# Patient Record
Sex: MALE | Race: WHITE | NOT HISPANIC OR LATINO | URBAN - METROPOLITAN AREA
[De-identification: names, ages, dates, MRNs, and addresses within clinical notes are randomized per-mention and may not be internally consistent; named-entity substitution may affect disease eponyms.]

---

## 2024-01-01 ENCOUNTER — APPOINTMENT (OUTPATIENT)
Dept: CT IMAGING | Facility: HOSPITAL | Age: 19
End: 2024-01-01
Payer: COMMERCIAL

## 2024-01-01 ENCOUNTER — HOSPITAL ENCOUNTER (EMERGENCY)
Facility: HOSPITAL | Age: 19
Discharge: HOME | End: 2024-01-01
Attending: EMERGENCY MEDICINE | Admitting: EMERGENCY MEDICINE
Payer: COMMERCIAL

## 2024-01-01 VITALS
HEART RATE: 78 BPM | DIASTOLIC BLOOD PRESSURE: 96 MMHG | HEIGHT: 75 IN | SYSTOLIC BLOOD PRESSURE: 128 MMHG | BODY MASS INDEX: 31.08 KG/M2 | WEIGHT: 250 LBS | OXYGEN SATURATION: 98 % | TEMPERATURE: 98.2 F | RESPIRATION RATE: 15 BRPM

## 2024-01-01 DIAGNOSIS — S19.9XXA NECK INJURY, INITIAL ENCOUNTER: Primary | ICD-10-CM

## 2024-01-01 DIAGNOSIS — J01.00 ACUTE MAXILLARY SINUSITIS, RECURRENCE NOT SPECIFIED: ICD-10-CM

## 2024-01-01 LAB
ALBUMIN SERPL-MCNC: 4.9 G/DL (ref 3.5–5.2)
ALBUMIN/GLOB SERPL: 3.3 G/DL
ALP SERPL-CCNC: 111 U/L (ref 39–117)
ALT SERPL W P-5'-P-CCNC: 31 U/L (ref 1–41)
ANION GAP SERPL CALCULATED.3IONS-SCNC: 9.2 MMOL/L (ref 5–15)
AST SERPL-CCNC: 28 U/L (ref 1–40)
BASOPHILS # BLD AUTO: 0.04 10*3/MM3 (ref 0–0.2)
BASOPHILS NFR BLD AUTO: 0.5 % (ref 0–1.5)
BILIRUB SERPL-MCNC: 2.5 MG/DL (ref 0–1.2)
BUN SERPL-MCNC: 16 MG/DL (ref 6–20)
BUN/CREAT SERPL: 15.4 (ref 7–25)
CALCIUM SPEC-SCNC: 9.3 MG/DL (ref 8.6–10.5)
CHLORIDE SERPL-SCNC: 106 MMOL/L (ref 98–107)
CO2 SERPL-SCNC: 26.8 MMOL/L (ref 22–29)
CREAT SERPL-MCNC: 1.04 MG/DL (ref 0.76–1.27)
DEPRECATED RDW RBC AUTO: 38.4 FL (ref 37–54)
EGFRCR SERPLBLD CKD-EPI 2021: 105.4 ML/MIN/1.73
EOSINOPHIL # BLD AUTO: 0.69 10*3/MM3 (ref 0–0.4)
EOSINOPHIL NFR BLD AUTO: 9 % (ref 0.3–6.2)
ERYTHROCYTE [DISTWIDTH] IN BLOOD BY AUTOMATED COUNT: 12.3 % (ref 12.3–15.4)
GLOBULIN UR ELPH-MCNC: 1.5 GM/DL
GLUCOSE SERPL-MCNC: 100 MG/DL (ref 65–99)
HCT VFR BLD AUTO: 46.8 % (ref 37.5–51)
HGB BLD-MCNC: 16 G/DL (ref 13–17.7)
IMM GRANULOCYTES # BLD AUTO: 0.01 10*3/MM3 (ref 0–0.05)
IMM GRANULOCYTES NFR BLD AUTO: 0.1 % (ref 0–0.5)
LYMPHOCYTES # BLD AUTO: 2.16 10*3/MM3 (ref 0.7–3.1)
LYMPHOCYTES NFR BLD AUTO: 28.1 % (ref 19.6–45.3)
MCH RBC QN AUTO: 28.9 PG (ref 26.6–33)
MCHC RBC AUTO-ENTMCNC: 34.2 G/DL (ref 31.5–35.7)
MCV RBC AUTO: 84.6 FL (ref 79–97)
MONOCYTES # BLD AUTO: 0.69 10*3/MM3 (ref 0.1–0.9)
MONOCYTES NFR BLD AUTO: 9 % (ref 5–12)
NEUTROPHILS NFR BLD AUTO: 4.09 10*3/MM3 (ref 1.7–7)
NEUTROPHILS NFR BLD AUTO: 53.3 % (ref 42.7–76)
PLATELET # BLD AUTO: 252 10*3/MM3 (ref 140–450)
PMV BLD AUTO: 10.9 FL (ref 6–12)
POTASSIUM SERPL-SCNC: 4.2 MMOL/L (ref 3.5–5.2)
PROT SERPL-MCNC: 6.4 G/DL (ref 6–8.5)
RBC # BLD AUTO: 5.53 10*6/MM3 (ref 4.14–5.8)
SODIUM SERPL-SCNC: 142 MMOL/L (ref 136–145)
WBC NRBC COR # BLD AUTO: 7.68 10*3/MM3 (ref 3.4–10.8)

## 2024-01-01 PROCEDURE — 70491 CT SOFT TISSUE NECK W/DYE: CPT

## 2024-01-01 PROCEDURE — 80053 COMPREHEN METABOLIC PANEL: CPT

## 2024-01-01 PROCEDURE — 25010000002 DEXAMETHASONE SODIUM PHOSPHATE 10 MG/ML SOLUTION

## 2024-01-01 PROCEDURE — 25510000001 IOPAMIDOL PER 1 ML: Performed by: EMERGENCY MEDICINE

## 2024-01-01 PROCEDURE — 96374 THER/PROPH/DIAG INJ IV PUSH: CPT

## 2024-01-01 PROCEDURE — 85025 COMPLETE CBC W/AUTO DIFF WBC: CPT

## 2024-01-01 PROCEDURE — 36415 COLL VENOUS BLD VENIPUNCTURE: CPT

## 2024-01-01 PROCEDURE — 99285 EMERGENCY DEPT VISIT HI MDM: CPT

## 2024-01-01 PROCEDURE — 25010000002 KETOROLAC TROMETHAMINE PER 15 MG

## 2024-01-01 PROCEDURE — 96375 TX/PRO/DX INJ NEW DRUG ADDON: CPT

## 2024-01-01 RX ORDER — SODIUM CHLORIDE 0.9 % (FLUSH) 0.9 %
10 SYRINGE (ML) INJECTION AS NEEDED
Status: DISCONTINUED | OUTPATIENT
Start: 2024-01-01 | End: 2024-01-01 | Stop reason: HOSPADM

## 2024-01-01 RX ORDER — DOXYCYCLINE 100 MG/1
100 CAPSULE ORAL 2 TIMES DAILY
Qty: 14 CAPSULE | Refills: 0 | Status: SHIPPED | OUTPATIENT
Start: 2024-01-01 | End: 2024-01-08

## 2024-01-01 RX ORDER — KETOROLAC TROMETHAMINE 30 MG/ML
30 INJECTION, SOLUTION INTRAMUSCULAR; INTRAVENOUS ONCE
Status: COMPLETED | OUTPATIENT
Start: 2024-01-01 | End: 2024-01-01

## 2024-01-01 RX ORDER — DEXAMETHASONE SODIUM PHOSPHATE 10 MG/ML
10 INJECTION, SOLUTION INTRAMUSCULAR; INTRAVENOUS ONCE
Status: COMPLETED | OUTPATIENT
Start: 2024-01-01 | End: 2024-01-01

## 2024-01-01 RX ADMIN — IOPAMIDOL 75 ML: 755 INJECTION, SOLUTION INTRAVENOUS at 16:52

## 2024-01-01 RX ADMIN — KETOROLAC TROMETHAMINE 30 MG: 30 INJECTION INTRAMUSCULAR; INTRAVENOUS at 17:30

## 2024-01-01 RX ADMIN — DEXAMETHASONE SODIUM PHOSPHATE 10 MG: 10 INJECTION, SOLUTION INTRAMUSCULAR; INTRAVENOUS at 17:30

## 2024-01-01 NOTE — DISCHARGE INSTRUCTIONS
Return to emergency department for any worsening signs or symptoms, such as shortness of breath or difficulty swallowing.  Recommended follow-up with PCP.  Refer to the attached instructions for further information.  Take antibiotics for sinus infection as directed until it is gone. It treats the infection and stops it from returning. Not taking all the medicine can make future infections hard to treat.
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
## 2024-01-26 ENCOUNTER — OFFICE VISIT (OUTPATIENT)
Dept: FAMILY MEDICINE CLINIC | Facility: CLINIC | Age: 19
End: 2024-01-26
Payer: COMMERCIAL

## 2024-01-26 VITALS
OXYGEN SATURATION: 96 % | DIASTOLIC BLOOD PRESSURE: 70 MMHG | WEIGHT: 252 LBS | TEMPERATURE: 97.3 F | HEIGHT: 74 IN | HEART RATE: 80 BPM | SYSTOLIC BLOOD PRESSURE: 104 MMHG | BODY MASS INDEX: 32.34 KG/M2

## 2024-01-26 DIAGNOSIS — R17 ELEVATED BILIRUBIN: Primary | ICD-10-CM

## 2024-01-26 DIAGNOSIS — Z91.09 ENVIRONMENTAL ALLERGIES: ICD-10-CM

## 2024-01-26 DIAGNOSIS — Z13.6 SCREENING FOR ISCHEMIC HEART DISEASE: ICD-10-CM

## 2024-01-26 NOTE — PROGRESS NOTES
"Chief Complaint  Establish Care (Pt would like to discuss recent labs and elevation /-ct scan done that showed cyst in each sinus cavity (ENT referral))    Subjective        Tenzin Figueroa presents to [facility]  History of Present Illness  18yoM with elevated bilirubin who presents to establish care.     Recent neck injury that he went to the ER for and CT showed:     The visualized contents of the cranial vault are within normal limits. Scattered shotty lymph nodes are incidentally noted within the neck and mucous retention cyst within the right and left maxillary sinus. Mucosal thickening and secretions are seen within the ethmoid air cells.     Has chronic allergies - not taking medication currently.    Also has had chronic mild elevation of bilirubin. No jaundice, scleral icterus. No severe abd pain or fevers.             Objective   Vital Signs:  /70 (BP Location: Right arm, Patient Position: Sitting, Cuff Size: Adult)   Pulse 80   Temp 97.3 °F (36.3 °C) (Infrared)   Ht 188 cm (74\")   Wt 114 kg (252 lb)   SpO2 96%   BMI 32.35 kg/m²   Estimated body mass index is 32.35 kg/m² as calculated from the following:    Height as of this encounter: 188 cm (74\").    Weight as of this encounter: 114 kg (252 lb).  Facility age limit for growth %cristian is 20 years.    BMI is >= 30 and <35. (Class 1 Obesity). The following options were offered after discussion;: exercise counseling/recommendations and nutrition counseling/recommendations      Physical Exam  Constitutional:       General: He is not in acute distress.  Eyes:      General: No scleral icterus.     Conjunctiva/sclera: Conjunctivae normal.   Cardiovascular:      Rate and Rhythm: Normal rate and regular rhythm.   Pulmonary:      Effort: Pulmonary effort is normal. No respiratory distress.      Breath sounds: Normal breath sounds.   Abdominal:      Tenderness: There is no abdominal tenderness. There is no guarding or rebound. "   Skin:     General: Skin is warm and dry.      Coloration: Skin is not jaundiced.   Neurological:      Mental Status: He is alert and oriented to person, place, and time.   Psychiatric:         Mood and Affect: Mood normal.         Behavior: Behavior normal.        Result Review :    The following data was reviewed by: Latrice Kumar MD on 01/26/2024:  Common labs          1/1/2024    15:55 1/26/2024    14:30   Common Labs   Glucose 100  82    BUN 16  15    Creatinine 1.04  1.09    Sodium 142  139    Potassium 4.2  4.2    Chloride 106  102    Calcium 9.3  9.8    Total Protein  6.5    Albumin 4.9  4.9    Total Bilirubin 2.5  2.1    Alkaline Phosphatase 111  97    AST (SGOT) 28  21    ALT (SGPT) 31  33    WBC 7.68     Hemoglobin 16.0     Hematocrit 46.8     Platelets 252     Total Cholesterol  178    Triglycerides  100    HDL Cholesterol  31    LDL Cholesterol   128      Data reviewed : none             Assessment and Plan     Diagnoses and all orders for this visit:    1. Elevated bilirubin (Primary)  Assessment & Plan:  Discussed that may be related to biliary disease, Gilbert Syndrome. No evidence of jaundice, scleral icterus. Recent CBC without anemia - low risk of hemolysis. Repeat CMP and direct bili for monitoring.     Orders:  -     Comprehensive metabolic panel  -     Bilirubin, Direct    2. Screening for ischemic heart disease  -     Lipid panel    3. Environmental allergies  Assessment & Plan:  Discussed CT scan consistent with chronic allergies.   Recommend intranasal steroids, antihistamines regularly. Consider allergy shots.                Follow Up     No follow-ups on file.  Patient was given instructions and counseling regarding his condition or for health maintenance advice. Please see specific information pulled into the AVS if appropriate.

## 2024-01-27 LAB
ALBUMIN SERPL-MCNC: 4.9 G/DL (ref 3.5–5.2)
ALBUMIN/GLOB SERPL: 3.1 G/DL
ALP SERPL-CCNC: 97 U/L (ref 39–117)
ALT SERPL-CCNC: 33 U/L (ref 1–41)
AST SERPL-CCNC: 21 U/L (ref 1–40)
BILIRUB DIRECT SERPL-MCNC: 0.3 MG/DL (ref 0–0.3)
BILIRUB SERPL-MCNC: 2.1 MG/DL (ref 0–1.2)
BUN SERPL-MCNC: 15 MG/DL (ref 6–20)
BUN/CREAT SERPL: 13.8 (ref 7–25)
CALCIUM SERPL-MCNC: 9.8 MG/DL (ref 8.6–10.5)
CHLORIDE SERPL-SCNC: 102 MMOL/L (ref 98–107)
CHOLEST SERPL-MCNC: 178 MG/DL (ref 0–200)
CO2 SERPL-SCNC: 25.5 MMOL/L (ref 22–29)
CREAT SERPL-MCNC: 1.09 MG/DL (ref 0.76–1.27)
EGFRCR SERPLBLD CKD-EPI 2021: 99.6 ML/MIN/1.73
GLOBULIN SER CALC-MCNC: 1.6 GM/DL
GLUCOSE SERPL-MCNC: 82 MG/DL (ref 65–99)
HDLC SERPL-MCNC: 31 MG/DL (ref 40–60)
LDLC SERPL CALC-MCNC: 128 MG/DL (ref 0–100)
POTASSIUM SERPL-SCNC: 4.2 MMOL/L (ref 3.5–5.2)
PROT SERPL-MCNC: 6.5 G/DL (ref 6–8.5)
SODIUM SERPL-SCNC: 139 MMOL/L (ref 136–145)
TRIGL SERPL-MCNC: 100 MG/DL (ref 0–150)
VLDLC SERPL CALC-MCNC: 19 MG/DL (ref 5–40)

## 2024-02-01 PROBLEM — Z91.09 ENVIRONMENTAL ALLERGIES: Status: ACTIVE | Noted: 2024-02-01

## 2024-02-01 PROBLEM — R17 ELEVATED BILIRUBIN: Status: ACTIVE | Noted: 2024-02-01

## 2024-02-01 NOTE — ASSESSMENT & PLAN NOTE
Discussed that may be related to biliary disease, Gilbert Syndrome. No evidence of jaundice, scleral icterus. Recent CBC without anemia - low risk of hemolysis. Repeat CMP and direct bili for monitoring.

## 2024-02-01 NOTE — ASSESSMENT & PLAN NOTE
Discussed CT scan consistent with chronic allergies.   Recommend intranasal steroids, antihistamines regularly. Consider allergy shots.

## 2024-05-29 ENCOUNTER — OFFICE VISIT (OUTPATIENT)
Dept: FAMILY MEDICINE CLINIC | Facility: CLINIC | Age: 19
End: 2024-05-29
Payer: COMMERCIAL

## 2024-05-29 VITALS
OXYGEN SATURATION: 99 % | BODY MASS INDEX: 32.33 KG/M2 | HEIGHT: 75 IN | DIASTOLIC BLOOD PRESSURE: 76 MMHG | HEART RATE: 84 BPM | WEIGHT: 260 LBS | SYSTOLIC BLOOD PRESSURE: 142 MMHG

## 2024-05-29 DIAGNOSIS — R41.840 IMPAIRED CONCENTRATION: Primary | ICD-10-CM

## 2024-05-29 PROCEDURE — 99214 OFFICE O/P EST MOD 30 MIN: CPT | Performed by: STUDENT IN AN ORGANIZED HEALTH CARE EDUCATION/TRAINING PROGRAM

## 2024-05-29 RX ORDER — LEVOCETIRIZINE DIHYDROCHLORIDE 5 MG/1
2.5 TABLET, FILM COATED ORAL EVERY EVENING
COMMUNITY
Start: 2024-05-09 | End: 2024-05-29

## 2024-05-29 RX ORDER — ALBUTEROL SULFATE AND BUDESONIDE 90; 80 UG/1; UG/1
AEROSOL, METERED RESPIRATORY (INHALATION) AS NEEDED
COMMUNITY
Start: 2024-04-30

## 2024-05-29 RX ORDER — DESLORATADINE 5 MG/1
1 TABLET ORAL DAILY
COMMUNITY
Start: 2024-04-30

## 2024-05-29 RX ORDER — GUAIFENESIN AND PSEUDOEPHEDRINE HYDROCHLORIDE 600; 60 MG/1; MG/1
1 TABLET, EXTENDED RELEASE ORAL EVERY 12 HOURS
COMMUNITY
Start: 2024-04-30

## 2024-05-29 RX ORDER — EPINEPHRINE 0.3 MG/.3ML
0.15 INJECTION SUBCUTANEOUS ONCE
COMMUNITY
Start: 2024-04-30

## 2024-05-29 RX ORDER — ATOMOXETINE 40 MG/1
40 CAPSULE ORAL DAILY
Qty: 30 CAPSULE | Refills: 1 | Status: SHIPPED | OUTPATIENT
Start: 2024-05-29

## 2024-05-29 RX ORDER — AZELASTINE HYDROCHLORIDE, FLUTICASONE PROPIONATE 137; 50 UG/1; UG/1
SPRAY, METERED NASAL AS NEEDED
COMMUNITY
Start: 2024-04-30

## 2024-05-29 RX ORDER — MONTELUKAST SODIUM 10 MG/1
10 TABLET ORAL
COMMUNITY
Start: 2024-04-30

## 2024-05-29 NOTE — PROGRESS NOTES
"Chief Complaint  ADHD (Pt has concerns about adhd.)    Subjective        Tenzin Figueroa presents to [facility]  History of Present Illness  18yoM who presents with concerns of inattentiveness, difficulty concentrating.     Has felt like he has dealt with these symptoms most of his life. However, was always able to manage without medication. Was never formally diagnosed with ADHD. 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    No mood symptoms - anxiety/depression.  Good sleep hygiene.       Objective   Vital Signs:  /76   Pulse 84   Ht 190.5 cm (75\")   Wt 118 kg (260 lb)   SpO2 99%   BMI 32.50 kg/m²   Estimated body mass index is 32.5 kg/m² as calculated from the following:    Height as of this encounter: 190.5 cm (75\").    Weight as of this encounter: 118 kg (260 lb).  Facility age limit for growth %cristian is 20 years.            Physical Exam  Constitutional:       General: He is not in acute distress.  Eyes:      General: No scleral icterus.     Conjunctiva/sclera: Conjunctivae normal.   Cardiovascular:      Rate and Rhythm: Normal rate and regular rhythm.   Pulmonary:      Effort: Pulmonary effort is normal. No respiratory distress.      Breath sounds: Normal breath sounds.   Abdominal:      Tenderness: There is no abdominal tenderness. There is no guarding or rebound.   Skin:     General: Skin is warm and dry.   Neurological:      Mental Status: He is alert and oriented to person, place, and time.   Psychiatric:         Mood and Affect: Mood normal.         Behavior: Behavior normal.        Result Review :    The following data was reviewed by: Latrice Kumar MD on 05/29/2024:  Common labs          1/1/2024    15:55 1/26/2024    14:30   Common Labs   Glucose 100  82    BUN 16  15    Creatinine 1.04  1.09    Sodium 142  139    Potassium 4.2  4.2    Chloride 106  102    Calcium 9.3  9.8    Total Protein  6.5    Albumin 4.9  " 4.9    Total Bilirubin 2.5  2.1    Alkaline Phosphatase 111  97    AST (SGOT) 28  21    ALT (SGPT) 31  33    WBC 7.68     Hemoglobin 16.0     Hematocrit 46.8     Platelets 252     Total Cholesterol  178    Triglycerides  100    HDL Cholesterol  31    LDL Cholesterol   128      Data reviewed : none             Assessment and Plan     Diagnoses and all orders for this visit:    1. Impaired concentration (Primary)  Assessment & Plan:  Symptoms consistent with ADHD but no formal diagnosis.  Prefers to not start stimulant.   Discussed non-stimulant options such as wellbutrin, strattera.  Will start strattera 40mg daily. Can increase to 40mg BID if needed. Return in 6week for recheck. Will monitor BP closely - his girlfriend is a nurse and can do this at home. Goal BP <140/90.    Orders:  -     atomoxetine (Strattera) 40 MG capsule; Take 1 capsule by mouth Daily.  Dispense: 30 capsule; Refill: 1             Follow Up     Return in about 6 weeks (around 7/10/2024) for Recheck.  Patient was given instructions and counseling regarding his condition or for health maintenance advice. Please see specific information pulled into the AVS if appropriate.         Answers submitted by the patient for this visit:  Primary Reason for Visit (Submitted on 5/23/2024)  What is the primary reason for your visit?: Other  Other (Submitted on 5/23/2024)  Please describe your symptoms.: (Reason is ADHD) , Symptoms are, but not limited to leg bounce,pacing, impaulive, poor time management  Have you had these symptoms before?: Yes  How long have you been having these symptoms?: Greater than 2 weeks  Please describe any probable cause for these symptoms. : ADHD

## 2024-06-02 PROBLEM — R41.840 IMPAIRED CONCENTRATION: Status: ACTIVE | Noted: 2024-06-02

## 2024-06-02 NOTE — ASSESSMENT & PLAN NOTE
Symptoms consistent with ADHD but no formal diagnosis.  Prefers to not start stimulant.   Discussed non-stimulant options such as wellbutrin, strattera.  Will start strattera 40mg daily. Can increase to 40mg BID if needed. Return in 6week for recheck. Will monitor BP closely - his girlfriend is a nurse and can do this at home. Goal BP <140/90.

## 2024-07-17 ENCOUNTER — OFFICE VISIT (OUTPATIENT)
Dept: FAMILY MEDICINE CLINIC | Facility: CLINIC | Age: 19
End: 2024-07-17
Payer: COMMERCIAL

## 2024-07-17 VITALS
HEART RATE: 86 BPM | HEIGHT: 75 IN | BODY MASS INDEX: 32.45 KG/M2 | WEIGHT: 261 LBS | TEMPERATURE: 97.5 F | SYSTOLIC BLOOD PRESSURE: 132 MMHG | DIASTOLIC BLOOD PRESSURE: 74 MMHG | OXYGEN SATURATION: 98 %

## 2024-07-17 DIAGNOSIS — E66.9 OBESITY (BMI 30-39.9): ICD-10-CM

## 2024-07-17 DIAGNOSIS — R41.840 IMPAIRED CONCENTRATION: Primary | ICD-10-CM

## 2024-07-17 PROCEDURE — 99214 OFFICE O/P EST MOD 30 MIN: CPT | Performed by: STUDENT IN AN ORGANIZED HEALTH CARE EDUCATION/TRAINING PROGRAM

## 2024-07-17 RX ORDER — BUPROPION HYDROCHLORIDE 100 MG/1
100 TABLET, EXTENDED RELEASE ORAL 2 TIMES DAILY
Qty: 60 TABLET | Refills: 2 | Status: SHIPPED | OUTPATIENT
Start: 2024-07-17

## 2024-07-19 NOTE — PROGRESS NOTES
"Chief Complaint  ADHD (Pt has no concerns.)    Subjective        Tenzin Figueroa presents to [facility]  History of Present Illness  18yoM who presents  for follow up of attention deficits and obesity.    Has felt like he has dealt with these symptoms most of his life. However, was always able to manage without medication. Was never formally diagnosed with ADHD. 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    No mood symptoms - anxiety/depression.  Good sleep hygiene. Started strattera with good effect but side effects of nausea. One episode of vomiting.    Obesity - BMI increasing - up to 32kg/m2. Has been working with diet and exercise without improvement. No hx of pancreatitis, gallstones, thyroid cancer. Has not tried any other weight loss meds.      Objective   Vital Signs:  /74   Pulse 86   Temp 97.5 °F (36.4 °C)   Ht 190.5 cm (75\")   Wt 118 kg (261 lb)   SpO2 98%   BMI 32.62 kg/m²   Estimated body mass index is 32.62 kg/m² as calculated from the following:    Height as of this encounter: 190.5 cm (75\").    Weight as of this encounter: 118 kg (261 lb).  Facility age limit for growth %cristian is 20 years.            Physical Exam  Constitutional:       General: He is not in acute distress.  Eyes:      Conjunctiva/sclera: Conjunctivae normal.   Cardiovascular:      Rate and Rhythm: Normal rate and regular rhythm.   Pulmonary:      Effort: Pulmonary effort is normal. No respiratory distress.      Breath sounds: Normal breath sounds.   Abdominal:      Palpations: Abdomen is soft.      Tenderness: There is no abdominal tenderness.   Skin:     General: Skin is warm and dry.   Neurological:      Mental Status: He is alert and oriented to person, place, and time.   Psychiatric:         Mood and Affect: Mood normal.         Behavior: Behavior normal.        Result Review :    The following data was reviewed by: Latrice Kumar MD on " 07/17/2024:  Common labs          1/1/2024    15:55 1/26/2024    14:30   Common Labs   Glucose 100  82    BUN 16  15    Creatinine 1.04  1.09    Sodium 142  139    Potassium 4.2  4.2    Chloride 106  102    Calcium 9.3  9.8    Total Protein  6.5    Albumin 4.9  4.9    Total Bilirubin 2.5  2.1    Alkaline Phosphatase 111  97    AST (SGOT) 28  21    ALT (SGPT) 31  33    WBC 7.68     Hemoglobin 16.0     Hematocrit 46.8     Platelets 252     Total Cholesterol  178    Triglycerides  100    HDL Cholesterol  31    LDL Cholesterol   128      Data reviewed : none             Assessment and Plan     Diagnoses and all orders for this visit:    1. Impaired concentration (Primary)  Assessment & Plan:  Symptoms consistent with ADHD but no formal diagnosis.  Prefers to not start stimulant.   Previously tried strattera (nausea)  Start trial of wellbutrin SR 100mg BID. Will monitor BP closely - his girlfriend is a nurse and can do this at home. Goal BP <140/90.    Orders:  -     buPROPion SR (Wellbutrin SR) 100 MG 12 hr tablet; Take 1 tablet by mouth 2 (Two) Times a Day.  Dispense: 60 tablet; Refill: 2    2. Obesity (BMI 30-39.9)  -     Semaglutide-Weight Management 0.25 MG/0.5ML solution auto-injector; Inject 0.5 mL under the skin into the appropriate area as directed 1 (One) Time Per Week.  Dispense: 2 mL; Refill: 0    Weight loss options discussed including GLP1 agonist and Contrave. Risks and benefits of each were discussed in detail. Patient does not have personal history of seizure disorder, pancreatitis, or thyroid cancer. No FHx of thyroid cancer. Discussed that these medications are to be used in conjunction with diet and exercise.      Patient was counseled regarding a healthy diet high in whole grains, omega 3 fats, fruits and vegetables. She was counseled regarding recommendations for atleast 150minutes of moderate exercise such as walking weekly. Goal of atleast 500kcal reduction for weight loss.            Follow Up      No follow-ups on file.  Patient was given instructions and counseling regarding his condition or for health maintenance advice. Please see specific information pulled into the AVS if appropriate.

## 2024-07-19 NOTE — ASSESSMENT & PLAN NOTE
Symptoms consistent with ADHD but no formal diagnosis.  Prefers to not start stimulant.   Previously tried strattera (nausea)  Start trial of wellbutrin SR 100mg BID. Will monitor BP closely - his girlfriend is a nurse and can do this at home. Goal BP <140/90.

## 2024-07-24 DIAGNOSIS — E66.9 OBESITY (BMI 30-39.9): ICD-10-CM

## 2024-07-24 DIAGNOSIS — R41.840 IMPAIRED CONCENTRATION: ICD-10-CM

## 2024-07-24 RX ORDER — ATOMOXETINE 40 MG/1
40 CAPSULE ORAL DAILY
Qty: 30 CAPSULE | Refills: 1 | Status: SHIPPED | OUTPATIENT
Start: 2024-07-24 | End: 2024-07-24

## 2024-07-24 NOTE — TELEPHONE ENCOUNTER
Rx Refill Note  Requested Prescriptions     Pending Prescriptions Disp Refills    atomoxetine (Strattera) 40 MG capsule 30 capsule 1     Sig: Take 1 capsule by mouth Daily.      Last office visit with prescribing clinician: 7/17/2024   Last telemedicine visit with prescribing clinician: Visit date not found   Next office visit with prescribing clinician: 10/2/2024                         Would you like a call back once the refill request has been completed: [] Yes [] No    If the office needs to give you a call back, can they leave a voicemail: [] Yes [] No    Areli Inman MA  07/24/24, 07:38 EDT

## 2024-08-14 DIAGNOSIS — E66.9 CLASS 1 OBESITY WITHOUT SERIOUS COMORBIDITY WITH BODY MASS INDEX (BMI) OF 32.0 TO 32.9 IN ADULT, UNSPECIFIED OBESITY TYPE: Primary | ICD-10-CM

## 2024-11-04 ENCOUNTER — TELEPHONE (OUTPATIENT)
Dept: ORTHOPEDIC SURGERY | Facility: CLINIC | Age: 19
End: 2024-11-04

## 2024-11-04 ENCOUNTER — OFFICE VISIT (OUTPATIENT)
Dept: ORTHOPEDIC SURGERY | Facility: CLINIC | Age: 19
End: 2024-11-04
Payer: COMMERCIAL

## 2024-11-04 VITALS — WEIGHT: 263 LBS | BODY MASS INDEX: 33.75 KG/M2 | HEIGHT: 74 IN | TEMPERATURE: 98.7 F

## 2024-11-04 DIAGNOSIS — R52 PAIN: Primary | ICD-10-CM

## 2024-11-04 DIAGNOSIS — S89.91XA KNEE INJURY, RIGHT, INITIAL ENCOUNTER: ICD-10-CM

## 2024-11-04 RX ORDER — IBUPROFEN 200 MG
600 TABLET ORAL EVERY 6 HOURS PRN
COMMUNITY

## 2024-11-04 NOTE — TELEPHONE ENCOUNTER
Caller: JAXON    Relationship to patient: Other    Best call back number: [phone]    Patient is needing: JAXON WITH [facility] [facility] IS CALLING TO GET MRI ORDER FAXED TO THEM TO ASSIST PATIENT IN BEING SCHEDULED.   PLEASE FAX ORDER TO FAX# [phone]

## 2024-11-04 NOTE — PROGRESS NOTES
Patient: Tenzin Figueroa  YOB: 2005 19 y.o. male  Medical Record Number: [id number]    Chief Complaints:   Chief Complaint   Patient presents with    Left Knee - Initial Evaluation, Pain    Right Knee - Initial Evaluation, Pain       History of Present Illness:Tenzin Figueroa is a 19 y.o. male who presents as a new patient both myself as well as to the practice with complaints of right knee pain.  Patient reports he sustained an injury to his right knee while playing soccer in the eighth grade, at that time he had an MRI and recalls that he injured his meniscus he had 2 or 3 PRP injections which did not help, he has basically been living with it since.  He reports his knee pain is definitely gotten worse he has mechanical symptoms including locking and catching.  He does have some left knee pain as well but nothing like the right    Allergies: No Known Allergies    Medications:   Current Outpatient Medications   Medication Sig Dispense Refill    Airsupra 90-80 MCG/ACT aerosol As Needed. Please see attached for detailed directions      buPROPion SR (Wellbutrin SR) 100 MG 12 hr tablet Take 1 tablet by mouth 2 (Two) Times a Day. 60 tablet 2    EPINEPHrine (EPIPEN) 0.3 MG/0.3ML solution auto-injector injection Inject 0.15 mg into the appropriate muscle as directed by prescriber 1 (One) Time.      ibuprofen (ADVIL,MOTRIN) 200 MG tablet Take 3 tablets by mouth Every 6 (Six) Hours As Needed for Mild Pain.      Mucinex D  MG per 12 hr tablet Take 1 tablet by mouth Every 12 (Twelve) Hours.      Azelastine-Fluticasone 137-50 MCG/ACT suspension As Needed. (Patient not taking: Reported on 11/4/2024)      desloratadine (CLARINEX) 5 MG tablet Take 1 tablet by mouth Daily. (Patient not taking: Reported on 11/4/2024)      montelukast (SINGULAIR) 10 MG tablet Take 1 tablet by mouth every night at bedtime. (Patient not taking: Reported on 11/4/2024)      Semaglutide-Weight Management 0.5 MG/0.5ML solution auto-injector  "Inject 0.5 mL under the skin into the appropriate area as directed 1 (One) Time Per Week. (Patient not taking: Reported on 11/4/2024) 2 mL 0     No current facility-administered medications for this visit.         The following portions of the patient's history were reviewed and updated as appropriate: allergies, current medications, past family history, past medical history, past social history, past surgical history and problem list.    Review of Systems:   14 point review of systems was performed. All systems negative except pertinent positives/negatives listed in HPI above    Physical Exam:   Vitals:    11/04/24 1014   Temp: 98.7 °F (37.1 °C)   TempSrc: Temporal   Weight: 119 kg (263 lb)   Height: 188 cm (74\")   PainSc:   4   PainLoc: Knee       General: A and O x 3, ASA, NAD    Skin clear no unusual lesions noted  Bilateral knees patient has trace amount of effusion noted right knee none on the left with 120 degrees flexion neutral in extension positive medial Abbie right knee negative Lockman calf soft and nontender      Radiology:  Xrays 3views (ap,lateral, sunrise) bilateral knees were ordered and reviewed today secondary to pain and were normal.  No compared to views available    Assessment/Plan: Right knee injury with mechanical symptoms questionable history of meniscus injury  Left knee pain, most likely compensatory from the right knee    Patient and I discussed options, would definitely recommend a repeat MRI of the right knee since it has been several years, symptoms have worsened.  The concern obviously would be a meniscus tear that may need to be addressed through knee arthroscopy, we did briefly discuss knee arthroscopy but we obviously need to wait until we get that MRI results back, once we get those back we will contact patient and have further discussions regarding treatment options      Smita Diaz, APRN  11/4/2024  "

## 2024-11-06 ENCOUNTER — PATIENT ROUNDING (BHMG ONLY) (OUTPATIENT)
Dept: ORTHOPEDIC SURGERY | Facility: CLINIC | Age: 19
End: 2024-11-06
Payer: COMMERCIAL

## 2024-11-06 NOTE — PROGRESS NOTES
A [employer] Message has been sent to the patient for PATIENT ROUNDING with [facility]

## 2024-11-11 ENCOUNTER — TELEPHONE (OUTPATIENT)
Dept: ORTHOPEDIC SURGERY | Facility: CLINIC | Age: 19
End: 2024-11-11
Payer: COMMERCIAL

## 2024-12-08 ENCOUNTER — HOSPITAL ENCOUNTER (EMERGENCY)
Facility: HOSPITAL | Age: 19
Discharge: HOME | End: 2024-12-08
Attending: EMERGENCY MEDICINE | Admitting: EMERGENCY MEDICINE
Payer: COMMERCIAL

## 2024-12-08 VITALS
HEART RATE: 104 BPM | DIASTOLIC BLOOD PRESSURE: 79 MMHG | RESPIRATION RATE: 16 BRPM | TEMPERATURE: 99.9 F | BODY MASS INDEX: 32.85 KG/M2 | HEIGHT: 74 IN | WEIGHT: 256 LBS | OXYGEN SATURATION: 99 % | SYSTOLIC BLOOD PRESSURE: 111 MMHG

## 2024-12-08 DIAGNOSIS — R11.10 VOMITING, UNSPECIFIED VOMITING TYPE, UNSPECIFIED WHETHER NAUSEA PRESENT: Primary | ICD-10-CM

## 2024-12-08 DIAGNOSIS — R10.13 EPIGASTRIC PAIN: ICD-10-CM

## 2024-12-08 LAB
ALBUMIN SERPL-MCNC: 4.7 G/DL (ref 3.5–5.2)
ALBUMIN/GLOB SERPL: 2.5 G/DL
ALP SERPL-CCNC: 103 U/L (ref 39–117)
ALT SERPL W P-5'-P-CCNC: 28 U/L (ref 1–41)
ANION GAP SERPL CALCULATED.3IONS-SCNC: 8.4 MMOL/L (ref 5–15)
AST SERPL-CCNC: 18 U/L (ref 1–40)
BASOPHILS # BLD AUTO: 0.02 10*3/MM3 (ref 0–0.2)
BASOPHILS NFR BLD AUTO: 0.2 % (ref 0–1.5)
BILIRUB SERPL-MCNC: 3.5 MG/DL (ref 0–1.2)
BILIRUB UR QL STRIP: NEGATIVE
BUN SERPL-MCNC: 22 MG/DL (ref 6–20)
BUN/CREAT SERPL: 19.6 (ref 7–25)
CALCIUM SPEC-SCNC: 9.8 MG/DL (ref 8.6–10.5)
CHLORIDE SERPL-SCNC: 106 MMOL/L (ref 98–107)
CLARITY UR: CLEAR
CO2 SERPL-SCNC: 24.6 MMOL/L (ref 22–29)
COLOR UR: ABNORMAL
CREAT SERPL-MCNC: 1.12 MG/DL (ref 0.76–1.27)
D-LACTATE SERPL-SCNC: 1.2 MMOL/L (ref 0.5–2)
DEPRECATED RDW RBC AUTO: 38 FL (ref 37–54)
EGFRCR SERPLBLD CKD-EPI 2021: 95.9 ML/MIN/1.73
EOSINOPHIL # BLD AUTO: 0.07 10*3/MM3 (ref 0–0.4)
EOSINOPHIL NFR BLD AUTO: 0.6 % (ref 0.3–6.2)
ERYTHROCYTE [DISTWIDTH] IN BLOOD BY AUTOMATED COUNT: 12 % (ref 12.3–15.4)
FLUAV SUBTYP SPEC NAA+PROBE: NOT DETECTED
FLUBV RNA ISLT QL NAA+PROBE: NOT DETECTED
GLOBULIN UR ELPH-MCNC: 1.9 GM/DL
GLUCOSE SERPL-MCNC: 122 MG/DL (ref 65–99)
GLUCOSE UR STRIP-MCNC: NEGATIVE MG/DL
HCT VFR BLD AUTO: 50.1 % (ref 37.5–51)
HGB BLD-MCNC: 17.1 G/DL (ref 13–17.7)
HGB UR QL STRIP.AUTO: NEGATIVE
IMM GRANULOCYTES # BLD AUTO: 0.02 10*3/MM3 (ref 0–0.05)
IMM GRANULOCYTES NFR BLD AUTO: 0.2 % (ref 0–0.5)
KETONES UR QL STRIP: NEGATIVE
LEUKOCYTE ESTERASE UR QL STRIP.AUTO: NEGATIVE
LIPASE SERPL-CCNC: 14 U/L (ref 13–60)
LYMPHOCYTES # BLD AUTO: 0.31 10*3/MM3 (ref 0.7–3.1)
LYMPHOCYTES NFR BLD AUTO: 2.6 % (ref 19.6–45.3)
MCH RBC QN AUTO: 29.2 PG (ref 26.6–33)
MCHC RBC AUTO-ENTMCNC: 34.1 G/DL (ref 31.5–35.7)
MCV RBC AUTO: 85.6 FL (ref 79–97)
MONOCYTES # BLD AUTO: 0.45 10*3/MM3 (ref 0.1–0.9)
MONOCYTES NFR BLD AUTO: 3.8 % (ref 5–12)
NEUTROPHILS NFR BLD AUTO: 10.88 10*3/MM3 (ref 1.7–7)
NEUTROPHILS NFR BLD AUTO: 92.6 % (ref 42.7–76)
NITRITE UR QL STRIP: NEGATIVE
PH UR STRIP.AUTO: <=5 [PH] (ref 5–8)
PLATELET # BLD AUTO: 223 10*3/MM3 (ref 140–450)
PMV BLD AUTO: 11.2 FL (ref 6–12)
POTASSIUM SERPL-SCNC: 4.3 MMOL/L (ref 3.5–5.2)
PROT SERPL-MCNC: 6.6 G/DL (ref 6–8.5)
PROT UR QL STRIP: ABNORMAL
RBC # BLD AUTO: 5.85 10*6/MM3 (ref 4.14–5.8)
SARS-COV-2 RNA RESP QL NAA+PROBE: NOT DETECTED
SODIUM SERPL-SCNC: 139 MMOL/L (ref 136–145)
SP GR UR STRIP: >=1.03 (ref 1–1.03)
UROBILINOGEN UR QL STRIP: ABNORMAL
WBC NRBC COR # BLD AUTO: 11.75 10*3/MM3 (ref 3.4–10.8)

## 2024-12-08 PROCEDURE — 81003 URINALYSIS AUTO W/O SCOPE: CPT | Performed by: EMERGENCY MEDICINE

## 2024-12-08 PROCEDURE — 99283 EMERGENCY DEPT VISIT LOW MDM: CPT

## 2024-12-08 PROCEDURE — 25010000002 ONDANSETRON PER 1 MG: Performed by: EMERGENCY MEDICINE

## 2024-12-08 PROCEDURE — 25810000003 SODIUM CHLORIDE 0.9 % SOLUTION: Performed by: EMERGENCY MEDICINE

## 2024-12-08 PROCEDURE — 83690 ASSAY OF LIPASE: CPT | Performed by: EMERGENCY MEDICINE

## 2024-12-08 PROCEDURE — 96375 TX/PRO/DX INJ NEW DRUG ADDON: CPT

## 2024-12-08 PROCEDURE — 80053 COMPREHEN METABOLIC PANEL: CPT | Performed by: EMERGENCY MEDICINE

## 2024-12-08 PROCEDURE — 83605 ASSAY OF LACTIC ACID: CPT | Performed by: EMERGENCY MEDICINE

## 2024-12-08 PROCEDURE — 87636 SARSCOV2 & INF A&B AMP PRB: CPT | Performed by: EMERGENCY MEDICINE

## 2024-12-08 PROCEDURE — 25010000002 KETOROLAC TROMETHAMINE PER 15 MG: Performed by: EMERGENCY MEDICINE

## 2024-12-08 PROCEDURE — 96361 HYDRATE IV INFUSION ADD-ON: CPT

## 2024-12-08 PROCEDURE — 85025 COMPLETE CBC W/AUTO DIFF WBC: CPT | Performed by: EMERGENCY MEDICINE

## 2024-12-08 PROCEDURE — 96374 THER/PROPH/DIAG INJ IV PUSH: CPT

## 2024-12-08 RX ORDER — ONDANSETRON 2 MG/ML
4 INJECTION INTRAMUSCULAR; INTRAVENOUS ONCE
Status: COMPLETED | OUTPATIENT
Start: 2024-12-08 | End: 2024-12-08

## 2024-12-08 RX ORDER — KETOROLAC TROMETHAMINE 15 MG/ML
15 INJECTION, SOLUTION INTRAMUSCULAR; INTRAVENOUS ONCE
Status: COMPLETED | OUTPATIENT
Start: 2024-12-08 | End: 2024-12-08

## 2024-12-08 RX ORDER — ONDANSETRON 4 MG/1
4 TABLET, ORALLY DISINTEGRATING ORAL EVERY 6 HOURS PRN
Qty: 15 TABLET | Refills: 0 | Status: SHIPPED | OUTPATIENT
Start: 2024-12-08

## 2024-12-08 RX ADMIN — KETOROLAC TROMETHAMINE 15 MG: 15 INJECTION, SOLUTION INTRAMUSCULAR; INTRAVENOUS at 14:55

## 2024-12-08 RX ADMIN — ONDANSETRON 4 MG: 2 INJECTION, SOLUTION INTRAMUSCULAR; INTRAVENOUS at 14:55

## 2024-12-08 RX ADMIN — SODIUM CHLORIDE 1000 ML: 9 INJECTION, SOLUTION INTRAVENOUS at 14:55

## 2024-12-08 NOTE — Clinical Note
[facility]  [address]  [place] [address]    Tenzin Figueroa was seen and treated in our emergency department on 12/8/2024.  He may return to work on 12/10/2024.         Thank you for choosing [facility].    Pastora Priest MD

## 2024-12-08 NOTE — DISCHARGE INSTRUCTIONS
We discussed that the total white blood count is normal, but there are changes in the white blood cells for bacteria are increased while the viruses are down.  So this could be one of the other that is causing the problem virus or bacteria.  Your blood work is great.  You are given Toradol which is like ibuprofen through an IV and given Zofran through the IV as well plus IV fluids x 1 L. The goal is to to treat the symptoms which would be ibuprofen 600 mg every 6 hours with food or fluid and Zofran every 4-6 hours as needed for nausea.  I encourage you to try chicken bone broth or beef bone broth from the brand known as EXFO to feed your gut, to soothe the lining of your gut and to give you nutrition and nutrition for your good bacteria.  You can drink it straight or you can make a soup out of it.  Please follow-up with your primary care doctor this week as needed or you can return here if symptoms get worse.

## 2024-12-08 NOTE — Clinical Note
[facility]  [address]  [facility] [id number]    Tenzin Figueroa was seen and treated in our emergency department on 12/8/2024.  He may return to work on 12/10/2024.         Thank you for choosing [facility].    Pastora Priest MD

## 2024-12-08 NOTE — FSED PROVIDER NOTE
Subjective   History of Present Illness  19-year-old male who earliest morning woke up shaky and miserable and achy and then began having nonbilious nonbloody bloody vomiting several times and has had diarrhea brown watery x 1.  He has ached in his abdomen that is just above the umbilicus.  No back pain.  Feels ill and achy and weak.  He is still nauseated.  No fever at home.  No fever on arrival but his heart rate is elevated.  No headache or stiff or sore neck or rash.  No back pain.  He was the only 1 of 2 people that ate the vecuronium and cheese and the other food that had been in the car for couple hours and had not been kept cold.      Review of Systems    Past Medical History:   Diagnosis Date    Ankle sprain 2015    Arthritis     Dislocated elbow     Fracture of ankle     Fracture of wrist     Fracture, finger 9/20/18    Got screw in thumb    Knee swelling 2017    Tear of meniscus of knee 2017    Recieved PRP inj.    Wrist sprain 2015       No Known Allergies    Past Surgical History:   Procedure Laterality Date    HAND SURGERY Right     thumb screw placement       Family History   Problem Relation Age of Onset    Osteoporosis Maternal Grandmother        Social History     Socioeconomic History    Marital status: Significant Other   Tobacco Use    Smoking status: Never     Passive exposure: Never    Smokeless tobacco: Never   Vaping Use    Vaping status: Never Used   Substance and Sexual Activity    Alcohol use: Not Currently    Drug use: Never    Sexual activity: Yes     Partners: Female           Objective   Physical Exam  Vitals and nursing note reviewed.   Constitutional:       Appearance: Normal appearance. He is normal weight. He is ill-appearing. He is not toxic-appearing.   HENT:      Mouth/Throat:      Mouth: Mucous membranes are dry.   Eyes:      Conjunctiva/sclera: Conjunctivae normal.   Cardiovascular:      Rate and Rhythm: Regular rhythm. Tachycardia present.      Pulses: Normal pulses.       Heart sounds: Normal heart sounds. No murmur heard.  Pulmonary:      Effort: Pulmonary effort is normal. No respiratory distress.      Breath sounds: Normal breath sounds. No stridor. No wheezing, rhonchi or rales.   Abdominal:      Palpations: Abdomen is soft.      Tenderness: There is abdominal tenderness.      Comments: Bowel sounds diminished throughout but not absent.  Mild tenderness epigastric and with deep palpation the right upper quadrant and right lower quadrant were not negative for tenderness as was the left lower quadrant.   Musculoskeletal:         General: Normal range of motion.      Cervical back: Neck supple.   Skin:     General: Skin is warm and dry.      Capillary Refill: Capillary refill takes less than 2 seconds.   Neurological:      General: No focal deficit present.      Mental Status: He is alert and oriented to person, place, and time. Mental status is at baseline.   Psychiatric:         Mood and Affect: Mood normal.         Behavior: Behavior normal.         Thought Content: Thought content normal.         Judgment: Judgment normal.         Procedures           ED Course  ED Course as of 12/08/24 1610   Sun Dec 08, 2024   1501 COVID and flu are negative.  Urine shows trace protein. [AR]   1520 See what this shows white blood count total of 11.75 which is very slightly elevated but there is a high bump in the neutrophil percentage to 92% lymphocytes are down to 2.6%, slight percentage is down to 3.8% the neutrophil absolute's are elevated up to 10.88 and lymphocyte absolute's are down there is a mixed picture here this could be viral or bacterial. [AR]   1522 Patient is 12 so he does not have pancreatitis.  CMP is back with a sugar up to 122 which is likely stress laded, BUN is 22 total bilirubin is 3.5 the AST ALT alk phos are normal.  GFR is 25.9 which is normal [AR]      ED Course User Index  [AR] Pastora Priest MD                                           Medical Decision  Making  Differential for abdominal pain includes, but not limited to: MI, pericarditis, pneumonia, PE, abdominal aortic aneurysm (AAA), mesenteric ischemia, diabetic ketoacidosis (DKA), hepatic mass or abscess, cholecystitis, cholelithiasis, cholangitis, peptic ulcer, perforating peptic ulcer, esophagitis, appendicitis, peritonitis, IBD, IBS, Crohn's disease, ulcerative colitis, pancreatitis, mass or cyst      Will get blood work and give nausea medicine Toradol and IV fluids.  Reevaluate.  At this time I do not think the patient needs a CT.    We discussed that the total white blood count is normal, but there are changes in the white blood cells for bacteria are increased while the viruses are down.  So this could be one of the other that is causing the problem virus or bacteria.  Your blood work is great.  You are given Toradol which is like ibuprofen through an IV and given Zofran through the IV as well plus IV fluids x 1 L. The goal is to to treat the symptoms which would be ibuprofen 600 mg every 6 hours with food or fluid and Zofran every 4-6 hours as needed for nausea.  I encourage you to try chicken bone broth or beef bone broth from the brand known as Zoup to feed your gut, to soothe the lining of your gut and to give you nutrition and nutrition for your good bacteria.  You can drink it straight or you can make a soup out of it.  Please follow-up with your primary care doctor this week as needed or you can return here if symptoms get worse.    He understood and agreed    Amount and/or Complexity of Data Reviewed  Labs: ordered. Decision-making details documented in ED Course.    Risk  Prescription drug management.        Final diagnoses:   Vomiting, unspecified vomiting type, unspecified whether nausea present   Epigastric pain       ED Disposition  ED Disposition       ED Disposition   Discharge    Condition   Stable    Comment   --               Latrice Kumar MD  [address]  [place]  [place] [id number] [phone]      This week         Medication List        New Prescriptions      ondansetron ODT 4 MG disintegrating tablet  Commonly known as: ZOFRAN-ODT  Place 1 tablet on the tongue Every 6 (Six) Hours As Needed for Nausea for up to 15 doses.               Where to Get Your Medications        These medications were sent to [facility]/pharmacy #[id number] - [place], [place] - [address] AT [facility] [phone] [facility] [phone]   [address], [facility] [address]      Phone: [phone]   ondansetron ODT 4 MG disintegrating tablet

## 2025-01-27 ENCOUNTER — OFFICE VISIT (OUTPATIENT)
Dept: FAMILY MEDICINE CLINIC | Facility: CLINIC | Age: 20
End: 2025-01-27
Payer: COMMERCIAL

## 2025-01-27 VITALS
BODY MASS INDEX: 32.47 KG/M2 | OXYGEN SATURATION: 96 % | DIASTOLIC BLOOD PRESSURE: 82 MMHG | TEMPERATURE: 97.3 F | HEIGHT: 74 IN | WEIGHT: 253 LBS | SYSTOLIC BLOOD PRESSURE: 126 MMHG | HEART RATE: 80 BPM

## 2025-01-27 DIAGNOSIS — Z13.6 SCREENING FOR ISCHEMIC HEART DISEASE: ICD-10-CM

## 2025-01-27 DIAGNOSIS — Z23 NEED FOR VACCINATION: ICD-10-CM

## 2025-01-27 DIAGNOSIS — Z00.00 ENCOUNTER FOR HEALTH MAINTENANCE EXAMINATION IN ADULT: Primary | ICD-10-CM

## 2025-01-27 DIAGNOSIS — Z13.0 SCREENING FOR DEFICIENCY ANEMIA: ICD-10-CM

## 2025-01-27 PROCEDURE — 90471 IMMUNIZATION ADMIN: CPT | Performed by: STUDENT IN AN ORGANIZED HEALTH CARE EDUCATION/TRAINING PROGRAM

## 2025-01-27 PROCEDURE — 90715 TDAP VACCINE 7 YRS/> IM: CPT | Performed by: STUDENT IN AN ORGANIZED HEALTH CARE EDUCATION/TRAINING PROGRAM

## 2025-01-27 PROCEDURE — 99395 PREV VISIT EST AGE 18-39: CPT | Performed by: STUDENT IN AN ORGANIZED HEALTH CARE EDUCATION/TRAINING PROGRAM

## 2025-01-27 RX ORDER — MELOXICAM 15 MG/1
15 TABLET ORAL DAILY
COMMUNITY
Start: 2024-12-05

## 2025-01-28 PROBLEM — Z00.00 ENCOUNTER FOR HEALTH MAINTENANCE EXAMINATION IN ADULT: Status: ACTIVE | Noted: 2025-01-28

## 2025-01-28 LAB
ALBUMIN SERPL-MCNC: 5 G/DL (ref 4.3–5.2)
ALP SERPL-CCNC: 101 IU/L (ref 44–121)
ALT SERPL-CCNC: 26 IU/L (ref 0–44)
AST SERPL-CCNC: 21 IU/L (ref 0–40)
BASOPHILS # BLD AUTO: 0 X10E3/UL (ref 0–0.2)
BASOPHILS NFR BLD AUTO: 1 %
BILIRUB SERPL-MCNC: 2.6 MG/DL (ref 0–1.2)
BUN SERPL-MCNC: 17 MG/DL (ref 6–20)
BUN/CREAT SERPL: 15 (ref 9–20)
CALCIUM SERPL-MCNC: 10 MG/DL (ref 8.7–10.2)
CHLORIDE SERPL-SCNC: 103 MMOL/L (ref 96–106)
CHOLEST SERPL-MCNC: 186 MG/DL (ref 100–199)
CO2 SERPL-SCNC: 21 MMOL/L (ref 20–29)
CREAT SERPL-MCNC: 1.1 MG/DL (ref 0.76–1.27)
EGFRCR SERPLBLD CKD-EPI 2021: 98 ML/MIN/1.73
EOSINOPHIL # BLD AUTO: 0.3 X10E3/UL (ref 0–0.4)
EOSINOPHIL NFR BLD AUTO: 5 %
ERYTHROCYTE [DISTWIDTH] IN BLOOD BY AUTOMATED COUNT: 12.7 % (ref 11.6–15.4)
GLOBULIN SER CALC-MCNC: 1.8 G/DL (ref 1.5–4.5)
GLUCOSE SERPL-MCNC: 79 MG/DL (ref 70–99)
HBA1C MFR BLD: 5.3 % (ref 4.8–5.6)
HCT VFR BLD AUTO: 47.9 % (ref 37.5–51)
HDLC SERPL-MCNC: 32 MG/DL
HGB BLD-MCNC: 16.1 G/DL (ref 13–17.7)
IMM GRANULOCYTES # BLD AUTO: 0 X10E3/UL (ref 0–0.1)
IMM GRANULOCYTES NFR BLD AUTO: 0 %
LDLC SERPL CALC-MCNC: 141 MG/DL (ref 0–99)
LYMPHOCYTES # BLD AUTO: 1.6 X10E3/UL (ref 0.7–3.1)
LYMPHOCYTES NFR BLD AUTO: 31 %
MCH RBC QN AUTO: 29 PG (ref 26.6–33)
MCHC RBC AUTO-ENTMCNC: 33.6 G/DL (ref 31.5–35.7)
MCV RBC AUTO: 86 FL (ref 79–97)
MONOCYTES # BLD AUTO: 0.5 X10E3/UL (ref 0.1–0.9)
MONOCYTES NFR BLD AUTO: 9 %
NEUTROPHILS # BLD AUTO: 2.9 X10E3/UL (ref 1.4–7)
NEUTROPHILS NFR BLD AUTO: 54 %
PLATELET # BLD AUTO: 261 X10E3/UL (ref 150–450)
POTASSIUM SERPL-SCNC: 4.5 MMOL/L (ref 3.5–5.2)
PROT SERPL-MCNC: 6.8 G/DL (ref 6–8.5)
RBC # BLD AUTO: 5.56 X10E6/UL (ref 4.14–5.8)
SODIUM SERPL-SCNC: 140 MMOL/L (ref 134–144)
TRIGL SERPL-MCNC: 67 MG/DL (ref 0–149)
TSH SERPL DL<=0.005 MIU/L-ACNC: 1.26 UIU/ML (ref 0.45–4.5)
VLDLC SERPL CALC-MCNC: 13 MG/DL (ref 5–40)
WBC # BLD AUTO: 5.3 X10E3/UL (ref 3.4–10.8)

## 2025-01-28 NOTE — PROGRESS NOTES
"Chief Complaint  Annual Exam (Pt has no concerns.)    Subjective        Tenzin Figueroa presents to [facility]  History of Present Illness  19yoM who presents  for annual exam today.    Doing well with current medications.  He is on Wellbutrin for inattentiveness and feels that this is helping.    Obesity - BMI increasing - up to 32kg/m2. Has been working with diet and exercise without improvement. No hx of pancreatitis, gallstones, thyroid cancer. Has recently started on semaglutide via [place].      Objective   Vital Signs:  /82   Pulse 80   Temp 97.3 °F (36.3 °C)   Ht 188 cm (74\")   Wt 115 kg (253 lb)   SpO2 96%   BMI 32.48 kg/m²   Estimated body mass index is 32.48 kg/m² as calculated from the following:    Height as of this encounter: 188 cm (74\").    Weight as of this encounter: 115 kg (253 lb).            Physical Exam  Constitutional:       General: He is not in acute distress.  Eyes:      Conjunctiva/sclera: Conjunctivae normal.   Cardiovascular:      Rate and Rhythm: Normal rate and regular rhythm.   Pulmonary:      Effort: Pulmonary effort is normal. No respiratory distress.   Abdominal:      Palpations: Abdomen is soft.      Tenderness: There is no abdominal tenderness.   Neurological:      Mental Status: He is alert and oriented to person, place, and time.   Psychiatric:         Mood and Affect: Mood normal.         Behavior: Behavior normal.        Result Review :  The following data was reviewed by: Latrice Kumar MD on 01/27/2025:  Common labs          12/8/2024    14:53 1/27/2025    15:34   Common Labs   Glucose 122  79    BUN 22  17    Creatinine 1.12  1.10    Sodium 139  140    Potassium 4.3  4.5    Chloride 106  103    Calcium 9.8  10.0    Total Protein  6.8    Albumin 4.7  5.0    Total Bilirubin 3.5  2.6    Alkaline Phosphatase 103  101    AST (SGOT) 18  21    ALT (SGPT) 28  26    WBC 11.75  5.3    Hemoglobin 17.1  16.1    Hematocrit 50.1  47.9  "   Platelets 223  261    Total Cholesterol  186    Triglycerides  67    HDL Cholesterol  32    LDL Cholesterol   141    Hemoglobin A1C  5.3      Data reviewed : None           Assessment and Plan   Diagnoses and all orders for this visit:    1. Encounter for health maintenance examination in adult (Primary)  Assessment & Plan:  Colonoscopy: avg risk, start at age 45  LDCT: never smoker  AAA: never smoker    Immunizations: eligible for Tdap  Labs: ordered today    The ASCVD Risk score (Ean SMART, et al., 2019) failed to calculate for the following reasons:    The 2019 ASCVD risk score is only valid for ages 40 to 79      Patient was counseled in regards to maintaining a healthy lifestyle, rich in whole grains, fruits and vegetables. Limit high saturated fats and processed sugars. Maintain an active lifestyle to promote overall health and well being.         2. Screening for deficiency anemia  -     CBC Auto Differential    3. Screening for ischemic heart disease  -     Comprehensive Metabolic Panel  -     ORDER: Hemoglobin A1c  -     TSH  -     Lipid Panel    4. Need for vaccination  -     Tdap Vaccine Greater Than or Equal To 8yo IM    Other orders  -     CBC & Differential             Follow Up   Return in about 1 year (around 1/27/2026) for Annual physical.  Patient was given instructions and counseling regarding his condition or for health maintenance advice. Please see specific information pulled into the AVS if appropriate.

## 2025-01-28 NOTE — ASSESSMENT & PLAN NOTE
Colonoscopy: avg risk, start at age 45  LDCT: never smoker  AAA: never smoker    Immunizations: eligible for Tdap  Labs: ordered today    The ASCVD Risk score (Ean SMART, et al., 2019) failed to calculate for the following reasons:    The 2019 ASCVD risk score is only valid for ages 40 to 79      Patient was counseled in regards to maintaining a healthy lifestyle, rich in whole grains, fruits and vegetables. Limit high saturated fats and processed sugars. Maintain an active lifestyle to promote overall health and well being.